# Patient Record
Sex: FEMALE | Race: WHITE | Employment: STUDENT | URBAN - METROPOLITAN AREA
[De-identification: names, ages, dates, MRNs, and addresses within clinical notes are randomized per-mention and may not be internally consistent; named-entity substitution may affect disease eponyms.]

---
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
## 2024-02-26 ENCOUNTER — NURSE ONLY (OUTPATIENT)
Dept: INTERNAL MEDICINE CLINIC | Facility: CLINIC | Age: 19
End: 2024-02-26
Payer: COMMERCIAL

## 2024-02-26 ENCOUNTER — TELEPHONE (OUTPATIENT)
Dept: INTERNAL MEDICINE CLINIC | Facility: CLINIC | Age: 19
End: 2024-02-26

## 2024-02-26 DIAGNOSIS — Z30.42 SURVEILLANCE OF CONTRACEPTIVE INJECTION: Primary | ICD-10-CM

## 2024-02-26 RX ORDER — MEDROXYPROGESTERONE ACETATE 150 MG/ML
150 INJECTION, SUSPENSION INTRAMUSCULAR ONCE
Status: COMPLETED | OUTPATIENT
Start: 2024-02-26 | End: 2024-02-26

## 2024-02-26 RX ORDER — MEDROXYPROGESTERONE ACETATE 150 MG/ML
150 INJECTION, SUSPENSION INTRAMUSCULAR ONCE
Status: CANCELLED | OUTPATIENT
Start: 2024-02-26 | End: 2024-02-26

## 2024-02-26 RX ADMIN — MEDROXYPROGESTERONE ACETATE 150 MG: 150 INJECTION, SUSPENSION INTRAMUSCULAR at 12:42:00

## 2024-04-04 ENCOUNTER — OFFICE VISIT (OUTPATIENT)
Dept: INTERNAL MEDICINE CLINIC | Facility: CLINIC | Age: 19
End: 2024-04-04
Payer: COMMERCIAL

## 2024-04-04 ENCOUNTER — APPOINTMENT (OUTPATIENT)
Dept: URGENT CARE | Age: 19
End: 2024-04-04

## 2024-04-04 VITALS
BODY MASS INDEX: 20.42 KG/M2 | HEIGHT: 62.6 IN | HEART RATE: 91 BPM | WEIGHT: 113.81 LBS | OXYGEN SATURATION: 98 % | TEMPERATURE: 98 F | SYSTOLIC BLOOD PRESSURE: 122 MMHG | DIASTOLIC BLOOD PRESSURE: 78 MMHG

## 2024-04-04 DIAGNOSIS — J02.9 PHARYNGITIS, UNSPECIFIED ETIOLOGY: Primary | ICD-10-CM

## 2024-04-04 DIAGNOSIS — H10.9 CONJUNCTIVITIS OF BOTH EYES, UNSPECIFIED CONJUNCTIVITIS TYPE: ICD-10-CM

## 2024-04-04 LAB
CONTROL LINE PRESENT WITH A CLEAR BACKGROUND (YES/NO): YES YES/NO
KIT LOT #: 6668 NUMERIC
STREP GRP A CUL-SCR: NEGATIVE

## 2024-04-04 PROCEDURE — 3074F SYST BP LT 130 MM HG: CPT | Performed by: FAMILY MEDICINE

## 2024-04-04 PROCEDURE — 99213 OFFICE O/P EST LOW 20 MIN: CPT | Performed by: FAMILY MEDICINE

## 2024-04-04 PROCEDURE — 87081 CULTURE SCREEN ONLY: CPT | Performed by: FAMILY MEDICINE

## 2024-04-04 PROCEDURE — 87880 STREP A ASSAY W/OPTIC: CPT | Performed by: FAMILY MEDICINE

## 2024-04-04 PROCEDURE — 3008F BODY MASS INDEX DOCD: CPT | Performed by: FAMILY MEDICINE

## 2024-04-04 PROCEDURE — 3078F DIAST BP <80 MM HG: CPT | Performed by: FAMILY MEDICINE

## 2024-04-04 RX ORDER — GENTAMICIN SULFATE 3 MG/ML
2 SOLUTION/ DROPS OPHTHALMIC 3 TIMES DAILY
Qty: 10 ML | Refills: 0 | Status: SHIPPED | OUTPATIENT
Start: 2024-04-04 | End: 2024-04-09

## 2024-04-04 RX ORDER — AZITHROMYCIN 250 MG/1
TABLET, FILM COATED ORAL
Qty: 6 TABLET | Refills: 0 | Status: SHIPPED | OUTPATIENT
Start: 2024-04-04 | End: 2024-04-08

## 2024-04-04 NOTE — PROGRESS NOTES
Eliza Elliott is a 18 year old female.  HPI:   Here to go over her URI issues   sore throat for 4d or so   worse at night   has stuffy nose    slight cough    dry    eyes red since yesrterday     mother has pink eyes       no NVD    the sore throat is a bit better today      no fever or aches     +frontal HAs      Current Outpatient Medications   Medication Sig Dispense Refill    desvenlafaxine ER 25 MG Oral Tablet 24 Hr Take 1 tablet (25 mg total) by mouth daily. 30 tablet 5    Multiple Vitamin (ONE-DAILY MULTI VITAMINS) Oral Tab Take 1 tablet by mouth daily.        Past Medical History:   Diagnosis Date    Anxiety       Social History:  Social History     Socioeconomic History    Marital status: Single   Tobacco Use    Smoking status: Never    Smokeless tobacco: Never   Vaping Use    Vaping Use: Never used   Substance and Sexual Activity    Alcohol use: No    Drug use: No    Sexual activity: Not Currently     Partners: Male        REVIEW OF SYSTEMS:   GENERAL HEALTH: feels well otherwise       EXAM:   /78   Pulse 91   Temp 97.8 °F (36.6 °C) (Temporal)   Ht 5' 2.6\" (1.59 m)   Wt 113 lb 12.8 oz (51.6 kg)   LMP 11/17/2023   SpO2 98%   BMI 20.42 kg/m²   GENERAL: well developed, well nourished,in no apparent distress  SKIN: no rashes  EYES: both injected   no DC   PERRLA    TMs nl  throat   nl     NECK: supple,no adenopathy  LUNGS: clear to auscultation       ASSESSMENT AND PLAN:   1. Pharyngitis, unspecified etiology  Persistent s/s , now w frontal JOHNS     Zpak ordered for sinus    strep was negative   check TC   - Rapid Strep  - Grp A Strep Cult, Throat [E]; Future  - Grp A Strep Cult, Throat [E]    2. Conjunctivitis of both eyes, unspecified conjunctivitis type  Gent opth sln ordered         The patient indicates understanding of these issues and agrees to the plan.  .

## 2024-05-08 ENCOUNTER — PATIENT MESSAGE (OUTPATIENT)
Dept: INTERNAL MEDICINE CLINIC | Facility: CLINIC | Age: 19
End: 2024-05-08

## 2024-05-08 DIAGNOSIS — Z30.42 SURVEILLANCE OF CONTRACEPTIVE INJECTION: Primary | ICD-10-CM

## 2024-05-09 RX ORDER — MEDROXYPROGESTERONE ACETATE 150 MG/ML
150 INJECTION, SUSPENSION INTRAMUSCULAR ONCE
Status: COMPLETED | OUTPATIENT
Start: 2024-05-13 | End: 2024-05-14

## 2024-05-09 NOTE — TELEPHONE ENCOUNTER
From: Eliza Elliott  To: Crista Simpson  Sent: 5/8/2024 3:55 PM CDT  Subject: Depot Shot Appointment     Hello! Just wondering if I can schedule a day to come in and get my next depo shot. I believe I have from may 14th-28th to get it.

## 2024-05-09 NOTE — TELEPHONE ENCOUNTER
Pt needs depot shot and working on setting up appt.     Please sign depot order if agree, thanks!

## 2024-05-14 ENCOUNTER — NURSE ONLY (OUTPATIENT)
Dept: INTERNAL MEDICINE CLINIC | Facility: CLINIC | Age: 19
End: 2024-05-14

## 2024-05-14 PROCEDURE — 96372 THER/PROPH/DIAG INJ SC/IM: CPT | Performed by: FAMILY MEDICINE

## 2024-05-14 RX ADMIN — MEDROXYPROGESTERONE ACETATE 150 MG: 150 INJECTION, SUSPENSION INTRAMUSCULAR at 09:48:00

## 2024-07-22 ENCOUNTER — PATIENT MESSAGE (OUTPATIENT)
Dept: INTERNAL MEDICINE CLINIC | Facility: CLINIC | Age: 19
End: 2024-07-22

## 2024-07-22 DIAGNOSIS — Z30.42 SURVEILLANCE OF CONTRACEPTIVE INJECTION: Primary | ICD-10-CM

## 2024-07-22 NOTE — TELEPHONE ENCOUNTER
From: Eliza Elliott  To: Crista Simpson  Sent: 7/22/2024 12:52 PM CDT  Subject: Depo Shot Appointment Request    Hello, just wondering if I’m able to schedule a time to come in for my next depo shot.

## 2024-07-29 ENCOUNTER — NURSE ONLY (OUTPATIENT)
Dept: INTERNAL MEDICINE CLINIC | Facility: CLINIC | Age: 19
End: 2024-07-29
Payer: COMMERCIAL

## 2024-07-29 RX ORDER — MEDROXYPROGESTERONE ACETATE 150 MG/ML
150 INJECTION, SUSPENSION INTRAMUSCULAR ONCE
Status: COMPLETED | OUTPATIENT
Start: 2024-07-29 | End: 2024-07-29

## 2024-07-29 RX ADMIN — MEDROXYPROGESTERONE ACETATE 150 MG: 150 INJECTION, SUSPENSION INTRAMUSCULAR at 09:55:00

## 2024-07-29 NOTE — TELEPHONE ENCOUNTER
Order signed:   Crista Simpson APRN   to Eliza Elliott       7/23/24  7:37 AM  Hi,  You can come in for your next Depo Provera injection any where between July 29th - August 12th. Please just make a nurse visit appointment.  KELSEY Scanlon, 07/23/24, 7:37 AM  Last read by Eliza Elliott at  1:59 PM on 7/26/202

## 2024-10-18 ENCOUNTER — NURSE ONLY (OUTPATIENT)
Dept: INTERNAL MEDICINE CLINIC | Facility: CLINIC | Age: 19
End: 2024-10-18
Payer: COMMERCIAL

## 2024-10-18 ENCOUNTER — TELEPHONE (OUTPATIENT)
Dept: INTERNAL MEDICINE CLINIC | Facility: CLINIC | Age: 19
End: 2024-10-18

## 2024-10-18 DIAGNOSIS — Z30.42 SURVEILLANCE OF CONTRACEPTIVE INJECTION: Primary | ICD-10-CM

## 2024-10-18 PROCEDURE — 96372 THER/PROPH/DIAG INJ SC/IM: CPT | Performed by: FAMILY MEDICINE

## 2024-10-18 RX ORDER — MEDROXYPROGESTERONE ACETATE 150 MG/ML
150 INJECTION, SUSPENSION INTRAMUSCULAR ONCE
Status: COMPLETED | OUTPATIENT
Start: 2024-10-18 | End: 2024-10-18

## 2024-10-18 RX ADMIN — MEDROXYPROGESTERONE ACETATE 150 MG: 150 INJECTION, SUSPENSION INTRAMUSCULAR at 12:38:00

## 2024-10-18 NOTE — TELEPHONE ENCOUNTER
Patient is coming in for Depo. Depo injection pended if appropriate.    Last depo provera 7/29/2024    LOV   4/4/2024 Dr Rangel

## 2024-11-11 NOTE — PROGRESS NOTES
HPI:   Eliza Elliott is a 19 year old female who presents for a complete physical exam. Symptoms: denies discharge, itching, burning or dysuria, no menses on the Depo Provera . Pt has been on Depo provera since 9/7/23.   Regular SBE- yes,Sexually active- yes,  Contraception- Depo-provera. STD history- none    Patient would like to get some lab work done. Pt states she had 2 episodes of almost fainting. Once in late summer and once in the fall. Pt thought it might of been low blood sugar or moving too quickly. Pt not sure. Pt almost blacked out both times but did not loose LOC. Pt has not felt this way recently.    Patient would also like a referral to Psych for counseling  for stress/anxiety.    Screening:  Immunization History   Administered Date(s) Administered    Covid-19 Vaccine Pfizer 30 mcg/0.3 ml 04/27/2021, 05/18/2021, 01/07/2022    DTAP 09/17/2004, 11/22/2004, 01/24/2005, 02/13/2006    DTAP-IPV 08/19/2009    FLULAVAL 6 months & older 0.5 ml Prefilled syringe (68277) 12/09/2021    FLUZONE 6 months and older PFS 0.5 ml (47150) 11/30/2016, 10/12/2019, 10/02/2020, 12/11/2023    Fluvirin, 3 Years & >, Im 10/13/2014    HEP A,Ped/Adol,(2 Dose) 09/12/2022    HEP B 07/14/2004, 09/17/2004, 11/22/2004, 01/24/2005    HIB 09/17/2004, 11/22/2004, 01/24/2005, 02/13/2006    HPV (Gardasil) 09/20/2016    Hep A, Adult 12/11/2023    Hpv Virus Vaccine 9 Carmen Im 11/30/2016, 05/11/2017    IPV 09/17/2004, 11/22/2004, 01/24/2005    Influenza 10/19/2015    MMR 07/25/2005, 08/19/2009    Meningococcal-Menactra 07/15/2015    Meningococcal-Menveo 2month-55yr 03/30/2021    Pneumococcal (Prevnar 7) 01/22/2004, 01/24/2005, 04/11/2005, 02/03/2006    TDAP 07/15/2015    Varicella 07/30/2007, 08/19/2009      Menarche- 11 yr  PAP- n/a  Any abnormal pap smears- none  Pregnancies- 0, Live births- 0  Mammogram-  n/a   Any breast cancer- none, or any gynecological cancer- none, any cancers paternal grandmother passed away in May for lung and brain  cancer.  Labs- 8/7/20  DEXA over 65yr- n/a  Flu shot-  today  Colon-n/a  Glasses/contacts- yes, last exam- 10/2023  Dental visits- 9/2024    Immunization History   Administered Date(s) Administered    Covid-19 Vaccine Pfizer 30 mcg/0.3 ml 04/27/2021, 05/18/2021, 01/07/2022    DTAP 09/17/2004, 11/22/2004, 01/24/2005, 02/13/2006    DTAP-IPV 08/19/2009    FLULAVAL 6 months & older 0.5 ml Prefilled syringe (62298) 12/09/2021    FLUZONE 6 months and older PFS 0.5 ml (20892) 11/30/2016, 10/12/2019, 10/02/2020, 12/11/2023    Fluvirin, 3 Years & >, Im 10/13/2014    HEP A,Ped/Adol,(2 Dose) 09/12/2022    HEP B 07/14/2004, 09/17/2004, 11/22/2004, 01/24/2005    HIB 09/17/2004, 11/22/2004, 01/24/2005, 02/13/2006    HPV (Gardasil) 09/20/2016    Hep A, Adult 12/11/2023    Hpv Virus Vaccine 9 Carmen Im 11/30/2016, 05/11/2017    IPV 09/17/2004, 11/22/2004, 01/24/2005    Influenza 10/19/2015    MMR 07/25/2005, 08/19/2009    Meningococcal-Menactra 07/15/2015    Meningococcal-Menveo 2month-55yr 03/30/2021    Pneumococcal (Prevnar 7) 01/22/2004, 01/24/2005, 04/11/2005, 02/03/2006    TDAP 07/15/2015    Varicella 07/30/2007, 08/19/2009     Wt Readings from Last 6 Encounters:   11/13/24 115 lb (52.2 kg)   04/04/24 113 lb 12.8 oz (51.6 kg) (22%, Z= -0.78)*   12/11/23 115 lb 12.8 oz (52.5 kg) (27%, Z= -0.62)*   08/21/23 113 lb (51.3 kg) (22%, Z= -0.77)*   09/12/22 115 lb (52.2 kg) (30%, Z= -0.52)*   08/31/22 110 lb (49.9 kg) (20%, Z= -0.85)*     * Growth percentiles are based on [facility] (Girls, 2-20 Years) data.     Body mass index is 20.37 kg/m².     Lab Results   Component Value Date     (H) 08/07/2020    GLU 91 11/15/2019    GLU 78 08/01/2018     No results found for: \"CHOLEST\"  No results found for: \"HDL\"  No results found for: \"LDL\"  Lab Results   Component Value Date    AST 18 11/15/2019    AST 17 08/01/2018     Lab Results   Component Value Date    ALT 20 11/15/2019    ALT 18 08/01/2018       Current Outpatient Medications    Medication Sig Dispense Refill    desvenlafaxine ER 25 MG Oral Tablet 24 Hr Take 1 tablet (25 mg total) by mouth daily. 30 tablet 5    Multiple Vitamin (ONE-DAILY MULTI VITAMINS) Oral Tab Take 1 tablet by mouth daily.        Past Medical History:    Anxiety      History reviewed. No pertinent surgical history.   Family History   Problem Relation Age of Onset    Anxiety Father     Anxiety Mother     Bipolar Disorder Maternal Grandmother     Anxiety Paternal Grandmother     Heart Disorder Paternal Grandfather     Bipolar Disorder Brother     ADHD Brother       Social History:   Social History     Socioeconomic History    Marital status: Single   Tobacco Use    Smoking status: Never    Smokeless tobacco: Never   Vaping Use    Vaping status: Never Used   Substance and Sexual Activity    Alcohol use: No    Drug use: No    Sexual activity: Not Currently     Partners: Male     Occ: student. : no. Children: 0.   Exercise:  3-4 times per week.  Diet: watches minimally     REVIEW OF SYSTEMS:   GENERAL: feels well otherwise  SKIN: denies any unusual skin lesions  EYES:denies blurred vision or double vision  HEENT: denies nasal congestion, sinus pain or ST  LUNGS: denies shortness of breath with exertion  CARDIOVASCULAR: denies chest pain on exertion  GI: denies abdominal pain,denies heartburn  : denies dysuria, vaginal discharge or itching,periods irregular   MUSCULOSKELETAL: denies back pain  NEURO: denies headaches, see HPI  PSYCHE: + depression and anxiety  HEMATOLOGIC: denies hx of anemia  ENDOCRINE: denies thyroid history  ALL/ASTHMA: denies hx of allergy or asthma    EXAM:   BP 90/64 (BP Location: Right arm, Patient Position: Sitting, Cuff Size: adult)   Pulse 62   Temp 97 °F (36.1 °C) (Temporal)   Ht 5' 3\" (1.6 m)   Wt 115 lb (52.2 kg)   LMP 11/17/2023   SpO2 100%   BMI 20.37 kg/m²   Body mass index is 20.37 kg/m².   GENERAL: well developed, well nourished,in no apparent distress  SKIN: no rashes,no  suspicious lesions  HEENT: atraumatic, normocephalic,ears and throat are clear  EYES:PERRLA, EOMI, normal optic disk,conjunctiva are clear  NECK: supple,no adenopathy,no bruits  CHEST: no chest tenderness  BREAST: no dominant or suspicious mass  LUNGS: clear to auscultation  CARDIO: RRR without murmur  GI: good BS's,no masses, HSM or tenderness  :deferred  MUSCULOSKELETAL: back is not tender,FROM of the back  EXTREMITIES: no cyanosis, clubbing or edema  NEURO: Alert & Oriented x 3.  CN II-XII intact. Moving all 4 extremities without difficulty.Gait and station normal.   No facial droop.  No slurred speech.  Patella & Brachioradialis DTR intact bilaterally. Muscle tone and strength normal and symmetric. Motor and sensation grossly normal.  PSYCH:  Speech, mood and affect are appropriate      ASSESSMENT AND PLAN:   Eliza Elliott is a 19 year old female who presents for a complete physical exam.  Self breast exam explained. Health maintenance, will check fasting Labs. Pt' s weight is Body mass index is 20.37 kg/m²., recommended low fat diet and aerobic exercise 30 minutes three times weekly.  The patient indicates understanding of these issues and agrees to the plan.  The patient is asked to return for CPX in one year.  1. Screening for STD (sexually transmitted disease)    - Chlamydia/Gc Amplification; Future    2. Need for influenza vaccination    - Fluzone trivalent vaccine, PF 0.5mL, 6mo+ (53053)    3. Routine general medical examination at a health care facility    - CBC With Differential With Platelet; Future  - Comp Metabolic Panel (14); Future  - Lipid Panel; Future  - Hemoglobin A1C; Future  - TSH W Reflex To Free T4; Future  - Vitamin D; Future  - Vitamin B12; Future    4. Surveillance of contraceptive injection  Continue Depo provera. Next injection due Jan 2025.    5. Syncope, unspecified syncope type  Advised to increase fluids, rest and eat on a regular basis. Pt is not to skip meals. Pt to avoid  alcohol, smoking, and caffeine .Avoid sudden change in positions. Keep all area well lit. Don't drive or use dangerous equipment when lightheaded. Call the office if the symptoms worsen.  - Vitamin D; Future  - Vitamin B12; Future    6. Anxiety    - [facility] Referral - In Network    Encounter Diagnoses   Name Primary?    Screening for STD (sexually transmitted disease)     Need for influenza vaccination     Routine general medical examination at a health care facility Yes    Surveillance of contraceptive injection     Syncope, unspecified syncope type     Anxiety        Orders Placed This Encounter   Procedures    CBC With Differential With Platelet    Comp Metabolic Panel (14)    Lipid Panel    Hemoglobin A1C    TSH W Reflex To Free T4    Vitamin D    Vitamin B12    Fluzone trivalent vaccine, PF 0.5mL, 6mo+ (03508)    Chlamydia/Gc Amplification       Meds & Refills for this Visit:  Requested Prescriptions      No prescriptions requested or ordered in this encounter       Imaging & Consults:  INFLUENZA VACCINE, TRI, PRESERV FREE, 0.5 ML  OP REFERRAL TO [facility]

## 2024-11-13 ENCOUNTER — LAB ENCOUNTER (OUTPATIENT)
Dept: LAB | Age: 19
End: 2024-11-13
Attending: FAMILY MEDICINE
Payer: COMMERCIAL

## 2024-11-13 ENCOUNTER — OFFICE VISIT (OUTPATIENT)
Dept: INTERNAL MEDICINE CLINIC | Facility: CLINIC | Age: 19
End: 2024-11-13
Payer: COMMERCIAL

## 2024-11-13 VITALS
HEART RATE: 62 BPM | WEIGHT: 115 LBS | BODY MASS INDEX: 20.37 KG/M2 | SYSTOLIC BLOOD PRESSURE: 90 MMHG | OXYGEN SATURATION: 100 % | HEIGHT: 63 IN | TEMPERATURE: 97 F | DIASTOLIC BLOOD PRESSURE: 64 MMHG

## 2024-11-13 DIAGNOSIS — Z00.00 ROUTINE GENERAL MEDICAL EXAMINATION AT A HEALTH CARE FACILITY: Primary | ICD-10-CM

## 2024-11-13 DIAGNOSIS — R55 SYNCOPE, UNSPECIFIED SYNCOPE TYPE: ICD-10-CM

## 2024-11-13 DIAGNOSIS — Z30.42 SURVEILLANCE OF CONTRACEPTIVE INJECTION: ICD-10-CM

## 2024-11-13 DIAGNOSIS — Z11.3 SCREENING FOR STD (SEXUALLY TRANSMITTED DISEASE): ICD-10-CM

## 2024-11-13 DIAGNOSIS — Z00.00 ROUTINE GENERAL MEDICAL EXAMINATION AT A HEALTH CARE FACILITY: ICD-10-CM

## 2024-11-13 DIAGNOSIS — Z23 NEED FOR INFLUENZA VACCINATION: ICD-10-CM

## 2024-11-13 DIAGNOSIS — F41.9 ANXIETY: ICD-10-CM

## 2024-11-13 LAB
ALBUMIN SERPL-MCNC: 4.7 G/DL (ref 3.2–4.8)
ALBUMIN/GLOB SERPL: 1.6 {RATIO} (ref 1–2)
ALP LIVER SERPL-CCNC: 92 U/L
ALT SERPL-CCNC: 11 U/L
ANION GAP SERPL CALC-SCNC: 8 MMOL/L (ref 0–18)
AST SERPL-CCNC: 23 U/L (ref ?–34)
BASOPHILS # BLD AUTO: 0.06 X10(3) UL (ref 0–0.2)
BASOPHILS NFR BLD AUTO: 0.9 %
BILIRUB SERPL-MCNC: 0.7 MG/DL (ref 0.3–1.2)
BUN BLD-MCNC: 10 MG/DL (ref 9–23)
CALCIUM BLD-MCNC: 10.3 MG/DL (ref 8.7–10.4)
CHLORIDE SERPL-SCNC: 106 MMOL/L (ref 98–112)
CHOLEST SERPL-MCNC: 137 MG/DL (ref ?–200)
CO2 SERPL-SCNC: 24 MMOL/L (ref 21–32)
CREAT BLD-MCNC: 0.87 MG/DL
EGFRCR SERPLBLD CKD-EPI 2021: 98 ML/MIN/1.73M2 (ref 60–?)
EOSINOPHIL # BLD AUTO: 0.07 X10(3) UL (ref 0–0.7)
EOSINOPHIL NFR BLD AUTO: 1 %
ERYTHROCYTE [DISTWIDTH] IN BLOOD BY AUTOMATED COUNT: 12.4 %
EST. AVERAGE GLUCOSE BLD GHB EST-MCNC: 97 MG/DL (ref 68–126)
FASTING PATIENT LIPID ANSWER: YES
FASTING STATUS PATIENT QL REPORTED: YES
GLOBULIN PLAS-MCNC: 2.9 G/DL (ref 2–3.5)
GLUCOSE BLD-MCNC: 82 MG/DL (ref 70–99)
HBA1C MFR BLD: 5 % (ref ?–5.7)
HCT VFR BLD AUTO: 45.6 %
HDLC SERPL-MCNC: 49 MG/DL (ref 40–59)
HGB BLD-MCNC: 15.7 G/DL
IMM GRANULOCYTES # BLD AUTO: 0.01 X10(3) UL (ref 0–1)
IMM GRANULOCYTES NFR BLD: 0.1 %
LDLC SERPL CALC-MCNC: 72 MG/DL (ref ?–100)
LYMPHOCYTES # BLD AUTO: 1.85 X10(3) UL (ref 1–4)
LYMPHOCYTES NFR BLD AUTO: 27.7 %
MCH RBC QN AUTO: 30 PG (ref 26–34)
MCHC RBC AUTO-ENTMCNC: 34.4 G/DL (ref 31–37)
MCV RBC AUTO: 87 FL
MONOCYTES # BLD AUTO: 0.65 X10(3) UL (ref 0.1–1)
MONOCYTES NFR BLD AUTO: 9.7 %
NEUTROPHILS # BLD AUTO: 4.05 X10 (3) UL (ref 1.5–7.7)
NEUTROPHILS # BLD AUTO: 4.05 X10(3) UL (ref 1.5–7.7)
NEUTROPHILS NFR BLD AUTO: 60.6 %
NONHDLC SERPL-MCNC: 88 MG/DL (ref ?–130)
OSMOLALITY SERPL CALC.SUM OF ELEC: 284 MOSM/KG (ref 275–295)
PLATELET # BLD AUTO: 250 10(3)UL (ref 150–450)
POTASSIUM SERPL-SCNC: 3.7 MMOL/L (ref 3.5–5.1)
PROT SERPL-MCNC: 7.6 G/DL (ref 5.7–8.2)
RBC # BLD AUTO: 5.24 X10(6)UL
SODIUM SERPL-SCNC: 138 MMOL/L (ref 136–145)
TRIGL SERPL-MCNC: 81 MG/DL (ref 30–149)
TSI SER-ACNC: 1.54 UIU/ML (ref 0.55–4.78)
VIT B12 SERPL-MCNC: 343 PG/ML (ref 211–911)
VIT D+METAB SERPL-MCNC: 22.8 NG/ML (ref 30–100)
VLDLC SERPL CALC-MCNC: 12 MG/DL (ref 0–30)
WBC # BLD AUTO: 6.7 X10(3) UL (ref 4–11)

## 2024-11-13 PROCEDURE — 80061 LIPID PANEL: CPT | Performed by: FAMILY MEDICINE

## 2024-11-13 PROCEDURE — 87491 CHLMYD TRACH DNA AMP PROBE: CPT | Performed by: FAMILY MEDICINE

## 2024-11-13 PROCEDURE — 82306 VITAMIN D 25 HYDROXY: CPT | Performed by: FAMILY MEDICINE

## 2024-11-13 PROCEDURE — 82607 VITAMIN B-12: CPT | Performed by: FAMILY MEDICINE

## 2024-11-13 PROCEDURE — 83036 HEMOGLOBIN GLYCOSYLATED A1C: CPT | Performed by: FAMILY MEDICINE

## 2024-11-13 PROCEDURE — 87591 N.GONORRHOEAE DNA AMP PROB: CPT | Performed by: FAMILY MEDICINE

## 2024-11-13 PROCEDURE — 80050 GENERAL HEALTH PANEL: CPT | Performed by: FAMILY MEDICINE

## 2024-11-14 LAB
C TRACH DNA SPEC QL NAA+PROBE: NEGATIVE
N GONORRHOEA DNA SPEC QL NAA+PROBE: NEGATIVE

## 2024-11-19 ENCOUNTER — PATIENT MESSAGE (OUTPATIENT)
Dept: INTERNAL MEDICINE CLINIC | Facility: CLINIC | Age: 19
End: 2024-11-19

## 2024-11-19 RX ORDER — ERGOCALCIFEROL 1.25 MG/1
50000 CAPSULE, LIQUID FILLED ORAL WEEKLY
Qty: 12 CAPSULE | Refills: 1 | Status: SHIPPED | OUTPATIENT
Start: 2024-11-19

## 2024-11-19 NOTE — TELEPHONE ENCOUNTER
Hi,     You have a Vit D deficiency. You need vit D 41568 IU once a week for 6 months, after 2000 IU of vit D 3 from over the counter daily!!!. Recheck vit D in 6 months. What pharmacy would you like the vit D sent to ?  Rest of your labs look good.  KELSEY Scanlon, 11/14/24, 11:07 AM     Hi,  Your chlamydia and GC culture are negative.  KELSEY Scanlon, 11/14/24, 12:22 PM   Written by KELSEY Scanlon on 11/14/2024 12:22 PM CST

## 2024-12-22 ENCOUNTER — PATIENT MESSAGE (OUTPATIENT)
Dept: INTERNAL MEDICINE CLINIC | Facility: CLINIC | Age: 19
End: 2024-12-22

## 2025-01-10 RX ORDER — MEDROXYPROGESTERONE ACETATE 150 MG/ML
150 INJECTION, SUSPENSION INTRAMUSCULAR ONCE
Status: CANCELLED | OUTPATIENT
Start: 2025-01-13

## 2025-01-13 ENCOUNTER — NURSE ONLY (OUTPATIENT)
Dept: INTERNAL MEDICINE CLINIC | Facility: CLINIC | Age: 20
End: 2025-01-13
Payer: COMMERCIAL

## 2025-01-13 RX ADMIN — MEDROXYPROGESTERONE ACETATE 150 MG: 150 INJECTION, SUSPENSION INTRAMUSCULAR at 09:46:00

## 2025-04-10 ENCOUNTER — TELEPHONE (OUTPATIENT)
Dept: INTERNAL MEDICINE CLINIC | Facility: CLINIC | Age: 20
End: 2025-04-10

## 2025-04-10 DIAGNOSIS — Z30.42 SURVEILLANCE OF CONTRACEPTIVE INJECTION: Primary | ICD-10-CM

## 2025-04-10 PROCEDURE — 81025 URINE PREGNANCY TEST: CPT | Performed by: FAMILY MEDICINE

## 2025-04-10 RX ORDER — MEDROXYPROGESTERONE ACETATE 150 MG/ML
150 INJECTION, SUSPENSION INTRAMUSCULAR ONCE
Status: COMPLETED | OUTPATIENT
Start: 2025-04-10 | End: 2025-04-11

## 2025-04-11 ENCOUNTER — NURSE ONLY (OUTPATIENT)
Dept: INTERNAL MEDICINE CLINIC | Facility: CLINIC | Age: 20
End: 2025-04-11
Payer: COMMERCIAL

## 2025-04-11 LAB
CONTROL LINE PRESENT WITH A CLEAR BACKGROUND (YES/NO): YES YES/NO
KIT LOT #: NORMAL NUMERIC
RESULT: NEGATIVE

## 2025-04-11 PROCEDURE — 96372 THER/PROPH/DIAG INJ SC/IM: CPT | Performed by: FAMILY MEDICINE

## 2025-04-11 RX ADMIN — MEDROXYPROGESTERONE ACETATE 150 MG: 150 INJECTION, SUSPENSION INTRAMUSCULAR at 10:18:00

## 2025-06-22 ENCOUNTER — TELEPHONE (OUTPATIENT)
Age: 20
End: 2025-06-22

## 2025-07-07 ENCOUNTER — NURSE ONLY (OUTPATIENT)
Dept: INTERNAL MEDICINE CLINIC | Facility: CLINIC | Age: 20
End: 2025-07-07
Payer: COMMERCIAL

## 2025-07-07 RX ADMIN — MEDROXYPROGESTERONE ACETATE 150 MG: 150 INJECTION, SUSPENSION INTRAMUSCULAR at 10:27:00

## 2025-07-07 NOTE — PROGRESS NOTES
Patient came in for NV to receive depo injection. Pt completed urine pregnancy test which came back negative. Pt tolerated injection well, no issues noted.

## (undated) DIAGNOSIS — Z30.41 ENCOUNTER FOR SURVEILLANCE OF CONTRACEPTIVE PILLS: ICD-10-CM

## (undated) NOTE — LETTER
[facility] [employer] [place] [employer] of Child Health Examination       Student's Name  [id number] [address],[facility] [address] [place] Title                           Date     Signature HEALTH HISTORY          TO BE COMPLETED AND SIGNED BY PARENT/GUARDIAN AND VERIFIED BY HEALTH CARE PROVIDER    ALLERGIES  (Food, drug, insect, other)  Penicillins MEDICATION  (List all prescribed or taken on a regular basis.)    Current Outpatient Prescript PHYSICAL EXAMINATION REQUIREMENTS (head circumference if <33 years old):   /64   Pulse 76   Temp 98.1 °F (36.7 °C) (Oral)   Resp 16   Ht 62\"   Wt 111 lb   LMP 07/21/2018 (Approximate)   SpO2 99%   Breastfeeding?  No   BMI 20.30 kg/m²     DIABETES SC Mouth/Dental Yes  Spinal examination Yes    Cardiovascular/HTN Yes  Nutritional status Yes    Respiratory Yes                   Diagnosis of Asthma: No Mental Health Yes        Currently Prescribed Asthma Medication:            Quick-relief  medication (e.

## (undated) NOTE — Clinical Note
Dear Dr. Babatunde Muir,       Thank you for referring Jazlyn Currie to the [facility].   Sincerely,  GARY Tate

## (undated) NOTE — LETTER
[facility] CARE IN [place]  [address] [address]  Dept: [phone]  Dept Fax: [phone]      December 6, 2017    Patient: Shabana Crawley   Date of Visit: 12/6/2017       To Whom It May Concern:    Shabana Crawley was seen and

## (undated) NOTE — MR AVS SNAPSHOT
[place]  [address] [address]  [address] [facility] [address] [phone]               Thank you for choosing us for your health care visit with Kena Sahu NP.   We are glad to serve you and happy to provide you with this sum Phone:  [phone]    - azithromycin 200 MG/5ML Susr  - Fluticasone Propionate 50 MCG/ACT Susp      You can get these medications from any pharmacy     Bring a paper prescription for each of these medications    - guaiFENesin-codeine 100-10 MG/5ML Soln o Make it fun – find ways to engage your children such as:  o playing a game of tag  o cooking healthy meals together  o creating a rainbow shopping list to find colorful fruits and vegetables  o go on a walking scavenger hunt through the neighborhood   o

## (undated) NOTE — LETTER
[facility] [employer] Child Health Examination       Student's Name  [facility] Shoulder Birth Da Title                           Date     Signature COMPLETED AND SIGNED BY PARENT/GUARDIAN AND VERIFIED BY HEALTH CARE PROVIDER    ALLERGIES  (Food, drug, insect, other)  Penicillins MEDICATION  (List all prescribed or taken on a regular basis.)    Current Outpatient Medications:   •  LO LOESTRIN FE 1 MG-1 Date     PHYSICAL EXAMINATION REQUIREMENTS    Entire section below to be completed by MD//APN/PA       PHYSICAL EXAMINATION REQUIREMENTS (head circumference if <33 years old):   BP 99/70 (BP Location: Right arm, Patient Position: Sitting) Yes    Eyes Yes     Screen result:   Genito-Urinary Yes  LMP   Nose Yes  Neurological Yes    Throat Yes  Musculoskeletal Yes    Mouth/Dental Yes  Spinal examination Yes    Cardiovascular/HTN Yes  Nutritional status Yes    Respiratory Yes [place]

## (undated) NOTE — ED AVS SNAPSHOT
Parent/Legal Guardian Access to the Online [employer] Record of a Patient 15to 16Years Old  Return completed form by Secure email to [facility] Department: [email or web address].     Requirements and Procedures   Under [facility] MyChart ID and password with another person, that person may be able to view my or my child’s health information, and health information about someone who has authorized me as a MyChart proxy.    ·  I agree that it is my responsibility to select a confident Sign-Up Form and I agree to its terms.        Authorization Form     Please enter Patient’s information below:   Name (last, first, middle initial) __________________________________________   Gender  Male  Female    Last 4 Digits of Social Security Number Parent/Legal Guardian Signature                                  For Patient ([age] years of age)  I agree to allow my parent/legal guardian, named above, online access to my medical information currently available and that may become available as a result